# Patient Record
Sex: FEMALE | URBAN - METROPOLITAN AREA
[De-identification: names, ages, dates, MRNs, and addresses within clinical notes are randomized per-mention and may not be internally consistent; named-entity substitution may affect disease eponyms.]

---

## 2022-06-04 ENCOUNTER — TELEPHONE ENCOUNTER (OUTPATIENT)
Dept: URBAN - METROPOLITAN AREA CLINIC 68 | Facility: CLINIC | Age: 32
End: 2022-06-04

## 2022-06-04 RX ORDER — DEXLANSOPRAZOLE 60 MG/1
CAPSULE, DELAYED RELEASE ORAL DAILY
Qty: 15 | Refills: 0 | OUTPATIENT
Start: 2018-05-30 | End: 2018-06-14

## 2022-06-05 ENCOUNTER — TELEPHONE ENCOUNTER (OUTPATIENT)
Dept: URBAN - METROPOLITAN AREA CLINIC 68 | Facility: CLINIC | Age: 32
End: 2022-06-05

## 2022-06-05 RX ORDER — RANITIDINE 150 MG/1
RANITIDINE HCL( 150MG ORAL 1 TWICE A DAY ) ACTIVE -HX ENTRY TABLET ORAL TWICE A DAY
Status: ACTIVE | COMMUNITY
Start: 2018-05-30

## 2022-06-05 RX ORDER — IBUPROFEN 200 MG/1
ADVIL( 200MG ORAL  AS NEEDED ) ACTIVE -HX ENTRY TABLET, COATED ORAL AS NEEDED
Status: ACTIVE | COMMUNITY
Start: 2018-05-30

## 2022-06-05 RX ORDER — NAPROXEN SODIUM 220 MG
ALEVE( 220MG ORAL  AS NEEDED ) ACTIVE -HX ENTRY TABLET ORAL AS NEEDED
Status: ACTIVE | COMMUNITY
Start: 2018-05-30

## 2022-06-05 RX ORDER — DESVENLAFAXINE SUCCINATE 50 MG/1
DESVENLAFAXINE SUCCINATE ER( 50MG ORAL 1 DAILY ) ACTIVE -HX ENTRY TABLET, EXTENDED RELEASE ORAL DAILY
Status: ACTIVE | COMMUNITY
Start: 2018-05-30

## 2022-06-25 ENCOUNTER — TELEPHONE ENCOUNTER (OUTPATIENT)
Age: 32
End: 2022-06-25

## 2022-06-25 RX ORDER — HYDROCORTISONE ACETATE AND PRAMOXINE HYDROCHLORIDE 25; 10 MG/G; MG/G
CREAM TOPICAL
Qty: 4 | Refills: 0 | OUTPATIENT
Start: 2018-05-30 | End: 2018-06-06

## 2022-06-25 RX ORDER — DEXLANSOPRAZOLE 60 MG/1
CAPSULE, DELAYED RELEASE ORAL DAILY
Qty: 15 | Refills: 0 | OUTPATIENT
Start: 2018-05-30 | End: 2018-06-14

## 2022-06-26 ENCOUNTER — TELEPHONE ENCOUNTER (OUTPATIENT)
Age: 32
End: 2022-06-26

## 2022-06-26 RX ORDER — NAPROXEN SODIUM 220 MG
ALEVE( 220MG ORAL  AS NEEDED ) ACTIVE -HX ENTRY TABLET ORAL AS NEEDED
Status: ACTIVE | COMMUNITY
Start: 2018-05-30

## 2022-06-26 RX ORDER — ONDANSETRON HYDROCHLORIDE 4 MG/1
ONDANSETRON( 4MG ORAL 1 TWICE A DAY ) ACTIVE -HX ENTRY TABLET, FILM COATED ORAL TWICE A DAY
Status: ACTIVE | COMMUNITY
Start: 2018-05-30

## 2022-06-26 RX ORDER — IBUPROFEN 200 MG/1
ADVIL( 200MG ORAL  AS NEEDED ) ACTIVE -HX ENTRY TABLET, COATED ORAL AS NEEDED
Status: ACTIVE | COMMUNITY
Start: 2018-05-30

## 2022-06-26 RX ORDER — BUPROPION HYDROCHLORIDE 150 MG/1
BUPROPION HCL( 150MG ORAL 1 DAILY ) ACTIVE -HX ENTRY TABLET, FILM COATED, EXTENDED RELEASE ORAL DAILY
Status: ACTIVE | COMMUNITY
Start: 2018-05-30

## 2022-06-26 RX ORDER — DESVENLAFAXINE SUCCINATE 50 MG/1
DESVENLAFAXINE SUCCINATE ER( 50MG ORAL 1 DAILY ) ACTIVE -HX ENTRY TABLET, EXTENDED RELEASE ORAL DAILY
Status: ACTIVE | COMMUNITY
Start: 2018-05-30

## 2022-06-30 ENCOUNTER — TELEPHONE ENCOUNTER (OUTPATIENT)
Dept: URBAN - METROPOLITAN AREA CLINIC 9 | Facility: CLINIC | Age: 32
End: 2022-06-30

## 2022-07-30 ENCOUNTER — TELEPHONE ENCOUNTER (OUTPATIENT)
Age: 32
End: 2022-07-30

## 2022-07-31 ENCOUNTER — TELEPHONE ENCOUNTER (OUTPATIENT)
Age: 32
End: 2022-07-31

## 2024-06-28 ENCOUNTER — OFFICE VISIT (OUTPATIENT)
Dept: URBAN - METROPOLITAN AREA CLINIC 9 | Facility: CLINIC | Age: 34
End: 2024-06-28

## 2024-07-18 ENCOUNTER — LAB OUTSIDE AN ENCOUNTER (OUTPATIENT)
Dept: URBAN - METROPOLITAN AREA CLINIC 9 | Facility: CLINIC | Age: 34
End: 2024-07-18

## 2024-07-18 ENCOUNTER — DASHBOARD ENCOUNTERS (OUTPATIENT)
Age: 34
End: 2024-07-18

## 2024-07-18 ENCOUNTER — OFFICE VISIT (OUTPATIENT)
Dept: URBAN - METROPOLITAN AREA CLINIC 9 | Facility: CLINIC | Age: 34
End: 2024-07-18
Payer: COMMERCIAL

## 2024-07-18 VITALS
HEIGHT: 63 IN | BODY MASS INDEX: 37.56 KG/M2 | WEIGHT: 212 LBS | DIASTOLIC BLOOD PRESSURE: 70 MMHG | SYSTOLIC BLOOD PRESSURE: 108 MMHG

## 2024-07-18 DIAGNOSIS — K21.9 CHRONIC GERD: ICD-10-CM

## 2024-07-18 DIAGNOSIS — K31.84 GASTROPARESIS: ICD-10-CM

## 2024-07-18 DIAGNOSIS — K76.9 LIVER LESION: ICD-10-CM

## 2024-07-18 DIAGNOSIS — R11.2 NAUSEA & VOMITING: ICD-10-CM

## 2024-07-18 PROBLEM — 300331000: Status: ACTIVE | Noted: 2024-07-18

## 2024-07-18 PROCEDURE — 99204 OFFICE O/P NEW MOD 45 MIN: CPT | Performed by: INTERNAL MEDICINE

## 2024-07-18 RX ORDER — NORETHINDRONE ACETATE AND ETHINYL ESTRADIOL, ETHINYL ESTRADIOL AND FERROUS FUMARATE 1MG-10(24)
KIT ORAL
Qty: 28 TABLET | Refills: 1 | Status: ACTIVE | COMMUNITY

## 2024-07-18 RX ORDER — RANITIDINE 150 MG/1
RANITIDINE HCL( 150MG ORAL 1 TWICE A DAY ) ACTIVE -HX ENTRY TABLET ORAL TWICE A DAY
Status: DISCONTINUED | COMMUNITY
Start: 2018-05-30

## 2024-07-18 RX ORDER — ONDANSETRON HYDROCHLORIDE 4 MG/1
ONDANSETRON( 4MG ORAL 1 TWICE A DAY ) ACTIVE -HX ENTRY TABLET, FILM COATED ORAL TWICE A DAY
Status: DISCONTINUED | COMMUNITY
Start: 2018-05-30

## 2024-07-18 RX ORDER — NAPROXEN SODIUM 220 MG
ALEVE( 220MG ORAL  AS NEEDED ) ACTIVE -HX ENTRY TABLET ORAL AS NEEDED
Status: DISCONTINUED | COMMUNITY
Start: 2018-05-30

## 2024-07-18 RX ORDER — SERTRALINE HYDROCHLORIDE 25 MG/1
TAKE 1 TABLET BY MOUTH DAILY TABLET, FILM COATED ORAL
Qty: 30 EACH | Refills: 0 | Status: ACTIVE | COMMUNITY

## 2024-07-18 RX ORDER — DESVENLAFAXINE SUCCINATE 50 MG/1
DESVENLAFAXINE SUCCINATE ER( 50MG ORAL 1 DAILY ) ACTIVE -HX ENTRY TABLET, EXTENDED RELEASE ORAL DAILY
Status: DISCONTINUED | COMMUNITY
Start: 2018-05-30

## 2024-07-18 RX ORDER — BUSPIRONE HYDROCHLORIDE 5 MG/1
TABLET ORAL
Qty: 90 TABLET | Refills: 0 | Status: ACTIVE | COMMUNITY

## 2024-07-18 RX ORDER — IBUPROFEN 200 MG/1
ADVIL( 200MG ORAL  AS NEEDED ) ACTIVE -HX ENTRY TABLET, COATED ORAL AS NEEDED
Status: ACTIVE | COMMUNITY
Start: 2018-05-30

## 2024-07-18 RX ORDER — BUPROPION HYDROCHLORIDE 150 MG/1
BUPROPION HCL( 150MG ORAL 1 DAILY ) ACTIVE -HX ENTRY TABLET, FILM COATED, EXTENDED RELEASE ORAL DAILY
Status: DISCONTINUED | COMMUNITY
Start: 2018-05-30

## 2024-07-18 NOTE — HPI-TODAY'S VISIT:
33 yr old female presents today with abdominal pain located in the upper abdomen are off and on for over 10 years. Patient states she has been able to control her pain herself Previously completed a Gastric emptying study but was unable to complete, diagnosised with Gastroparesis. Beign liver tumors. Previous history of Ulcers. Last Upper Endoscopy early 2020. Never has had a Colonoscopy. No family history of Colon Cancer. Previously diagnosised with acid reflux. Patient states it is a stabbing pain and buring without radiating that presents every couple months and can last two to three days. When it flares pain level is an 8 out 10. When patient has a flare she does experience nausea and vomiting. No change in bowel movements, no blood. Denies taking NSADS. No recent imaging of the abdomen.

## 2024-08-02 ENCOUNTER — LAB OUTSIDE AN ENCOUNTER (OUTPATIENT)
Dept: URBAN - METROPOLITAN AREA CLINIC 7 | Facility: CLINIC | Age: 34
End: 2024-08-02

## 2024-08-26 ENCOUNTER — CLAIMS CREATED FROM THE CLAIM WINDOW (OUTPATIENT)
Dept: URBAN - METROPOLITAN AREA SURGERY CENTER 5 | Facility: SURGERY CENTER | Age: 34
End: 2024-08-26
Payer: COMMERCIAL

## 2024-08-26 DIAGNOSIS — K31.89 OTHER DISEASES OF STOMACH AND DUODENUM: ICD-10-CM

## 2024-08-26 DIAGNOSIS — K21.9 GASTRO-ESOPHAGEAL REFLUX DISEASE WITHOUT ESOPHAGITIS: ICD-10-CM

## 2024-08-26 DIAGNOSIS — K22.89 OTHER SPECIFIED DISEASE OF ESOPHAGUS: ICD-10-CM

## 2024-08-26 PROCEDURE — 43239 EGD BIOPSY SINGLE/MULTIPLE: CPT | Performed by: INTERNAL MEDICINE

## 2024-08-26 PROCEDURE — 00731 ANES UPR GI NDSC PX NOS: CPT | Performed by: NURSE ANESTHETIST, CERTIFIED REGISTERED

## 2024-08-26 RX ORDER — IBUPROFEN 200 MG/1
ADVIL( 200MG ORAL  AS NEEDED ) ACTIVE -HX ENTRY TABLET, COATED ORAL AS NEEDED
Status: ACTIVE | COMMUNITY
Start: 2018-05-30

## 2024-08-26 RX ORDER — NORETHINDRONE ACETATE AND ETHINYL ESTRADIOL, ETHINYL ESTRADIOL AND FERROUS FUMARATE 1MG-10(24)
KIT ORAL
Qty: 28 TABLET | Refills: 1 | Status: ACTIVE | COMMUNITY

## 2024-08-26 RX ORDER — BUSPIRONE HYDROCHLORIDE 5 MG/1
TABLET ORAL
Qty: 90 TABLET | Refills: 0 | Status: ACTIVE | COMMUNITY

## 2024-08-26 RX ORDER — SERTRALINE HYDROCHLORIDE 25 MG/1
TAKE 1 TABLET BY MOUTH DAILY TABLET, FILM COATED ORAL
Qty: 30 EACH | Refills: 0 | Status: ACTIVE | COMMUNITY

## 2024-09-10 ENCOUNTER — TELEPHONE ENCOUNTER (OUTPATIENT)
Dept: URBAN - METROPOLITAN AREA CLINIC 9 | Facility: CLINIC | Age: 34
End: 2024-09-10